# Patient Record
Sex: FEMALE | Race: BLACK OR AFRICAN AMERICAN | Employment: FULL TIME | URBAN - METROPOLITAN AREA
[De-identification: names, ages, dates, MRNs, and addresses within clinical notes are randomized per-mention and may not be internally consistent; named-entity substitution may affect disease eponyms.]

---

## 2018-07-23 ENCOUNTER — TELEPHONE (OUTPATIENT)
Dept: OBGYN CLINIC | Age: 58
End: 2018-07-23

## 2018-07-23 RX ORDER — AZITHROMYCIN 250 MG/1
250 TABLET, FILM COATED ORAL DAILY
Qty: 1 PACKET | Refills: 0 | Status: SHIPPED | OUTPATIENT
Start: 2018-07-23 | End: 2018-08-02

## 2018-07-24 ENCOUNTER — TELEPHONE (OUTPATIENT)
Dept: OBGYN CLINIC | Age: 58
End: 2018-07-24

## 2018-07-24 NOTE — TELEPHONE ENCOUNTER
Returned phone call to pharmacy and gave verbal to pharmacist to dispense amoxicillin 500 mg tablets bid for 14 days.

## 2019-01-14 ENCOUNTER — OFFICE VISIT (OUTPATIENT)
Dept: INTERNAL MEDICINE CLINIC | Age: 59
End: 2019-01-14

## 2019-01-14 VITALS
TEMPERATURE: 97.9 F | SYSTOLIC BLOOD PRESSURE: 180 MMHG | WEIGHT: 186 LBS | HEIGHT: 65 IN | DIASTOLIC BLOOD PRESSURE: 100 MMHG | BODY MASS INDEX: 30.99 KG/M2 | HEART RATE: 90 BPM | OXYGEN SATURATION: 98 %

## 2019-01-14 DIAGNOSIS — I10 ESSENTIAL HYPERTENSION: Primary | ICD-10-CM

## 2019-01-14 PROCEDURE — 99203 OFFICE O/P NEW LOW 30 MIN: CPT | Performed by: FAMILY MEDICINE

## 2019-01-14 RX ORDER — AMLODIPINE BESYLATE 5 MG/1
5 TABLET ORAL DAILY
Qty: 30 TABLET | Refills: 3 | Status: SHIPPED | OUTPATIENT
Start: 2019-01-14 | End: 2019-01-21 | Stop reason: DRUGHIGH

## 2019-01-14 RX ORDER — HYDROCHLOROTHIAZIDE 12.5 MG/1
12.5 CAPSULE, GELATIN COATED ORAL DAILY
Qty: 30 CAPSULE | Refills: 3 | Status: CANCELLED | OUTPATIENT
Start: 2019-01-14

## 2019-01-14 ASSESSMENT — PATIENT HEALTH QUESTIONNAIRE - PHQ9
SUM OF ALL RESPONSES TO PHQ9 QUESTIONS 1 & 2: 0
SUM OF ALL RESPONSES TO PHQ QUESTIONS 1-9: 0
SUM OF ALL RESPONSES TO PHQ QUESTIONS 1-9: 0
2. FEELING DOWN, DEPRESSED OR HOPELESS: 0
1. LITTLE INTEREST OR PLEASURE IN DOING THINGS: 0

## 2019-01-15 DIAGNOSIS — I10 ESSENTIAL HYPERTENSION: ICD-10-CM

## 2019-01-15 LAB
BACTERIA: NORMAL /HPF
BILIRUBIN URINE: NEGATIVE
BLOOD, URINE: ABNORMAL
CLARITY: CLEAR
COLOR: YELLOW
EPITHELIAL CELLS, UA: NORMAL /HPF
GLUCOSE URINE: NEGATIVE MG/DL
KETONES, URINE: NEGATIVE MG/DL
LEUKOCYTE ESTERASE, URINE: ABNORMAL
NITRITE, URINE: POSITIVE
PH UA: 5.5 (ref 5–9)
PROTEIN UA: NEGATIVE MG/DL
RBC UA: NORMAL /HPF (ref 0–2)
SPECIFIC GRAVITY UA: 1 (ref 1–1.03)
URINE REFLEX TO CULTURE: YES
UROBILINOGEN, URINE: 0.2 E.U./DL
WBC UA: NORMAL /HPF (ref 0–5)

## 2019-01-16 LAB
ALBUMIN SERPL-MCNC: 4.2 G/DL
ALP BLD-CCNC: 93 U/L
ALT SERPL-CCNC: NORMAL U/L
ANION GAP SERPL CALCULATED.3IONS-SCNC: NORMAL MMOL/L
AST SERPL-CCNC: NORMAL U/L
BILIRUB SERPL-MCNC: 0.4 MG/DL (ref 0.1–1.4)
BUN BLDV-MCNC: 11 MG/DL
CALCIUM SERPL-MCNC: 9.2 MG/DL
CHLORIDE BLD-SCNC: 102 MMOL/L
CHOLESTEROL, TOTAL: 213 MG/DL
CHOLESTEROL/HDL RATIO: NORMAL
CO2: NORMAL MMOL/L
CREAT SERPL-MCNC: 1.04 MG/DL
GFR CALCULATED: NORMAL
GLUCOSE BLD-MCNC: 102 MG/DL
HDLC SERPL-MCNC: 55 MG/DL (ref 35–70)
LDL CHOLESTEROL CALCULATED: 133 MG/DL (ref 0–160)
POTASSIUM SERPL-SCNC: 4.1 MMOL/L
SODIUM BLD-SCNC: 141 MMOL/L
TOTAL PROTEIN: 7.2
TRIGL SERPL-MCNC: 125 MG/DL
VLDLC SERPL CALC-MCNC: 25 MG/DL

## 2019-01-17 LAB — URINE CULTURE, ROUTINE: NORMAL

## 2019-01-18 ENCOUNTER — HOSPITAL ENCOUNTER (OUTPATIENT)
Dept: NON INVASIVE DIAGNOSTICS | Age: 59
Discharge: HOME | End: 2019-01-18

## 2019-01-18 DIAGNOSIS — I10 ESSENTIAL HYPERTENSION: ICD-10-CM

## 2019-01-18 LAB
EKG ATRIAL RATE: 73 BPM
EKG P AXIS: 53 DEGREES
EKG P-R INTERVAL: 150 MS
EKG Q-T INTERVAL: 434 MS
EKG QRS DURATION: 86 MS
EKG QTC CALCULATION (BAZETT): 478 MS
EKG R AXIS: 55 DEGREES
EKG T AXIS: 245 DEGREES
EKG VENTRICULAR RATE: 73 BPM

## 2019-01-18 PROCEDURE — 93010 ELECTROCARDIOGRAM REPORT: CPT | Performed by: INTERNAL MEDICINE

## 2019-01-18 PROCEDURE — 93005 ELECTROCARDIOGRAM TRACING: CPT

## 2019-01-21 ENCOUNTER — OFFICE VISIT (OUTPATIENT)
Dept: INTERNAL MEDICINE CLINIC | Age: 59
End: 2019-01-21

## 2019-01-21 VITALS
HEIGHT: 65 IN | OXYGEN SATURATION: 98 % | SYSTOLIC BLOOD PRESSURE: 178 MMHG | BODY MASS INDEX: 30.62 KG/M2 | TEMPERATURE: 98 F | DIASTOLIC BLOOD PRESSURE: 70 MMHG | WEIGHT: 183.8 LBS | HEART RATE: 92 BPM

## 2019-01-21 DIAGNOSIS — I51.7 LEFT VENTRICULAR HYPERTROPHY BY ELECTROCARDIOGRAM: ICD-10-CM

## 2019-01-21 DIAGNOSIS — I10 ESSENTIAL HYPERTENSION: Primary | ICD-10-CM

## 2019-01-21 PROCEDURE — 99214 OFFICE O/P EST MOD 30 MIN: CPT | Performed by: FAMILY MEDICINE

## 2019-01-21 RX ORDER — LISINOPRIL 20 MG/1
20 TABLET ORAL DAILY
Qty: 30 TABLET | Refills: 3 | Status: SHIPPED | OUTPATIENT
Start: 2019-01-21 | End: 2019-01-28 | Stop reason: DRUGHIGH

## 2019-01-21 RX ORDER — ATORVASTATIN CALCIUM 40 MG/1
40 TABLET, FILM COATED ORAL DAILY
Qty: 30 TABLET | Refills: 3 | Status: CANCELLED | OUTPATIENT
Start: 2019-01-21

## 2019-01-21 RX ORDER — HYDROCHLOROTHIAZIDE 25 MG/1
25 TABLET ORAL DAILY
Qty: 30 TABLET | Refills: 0 | Status: CANCELLED | OUTPATIENT
Start: 2019-01-21

## 2019-01-21 RX ORDER — AMLODIPINE BESYLATE 10 MG/1
10 TABLET ORAL DAILY
Qty: 30 TABLET | Refills: 3 | Status: SHIPPED | OUTPATIENT
Start: 2019-01-21 | End: 2019-05-20 | Stop reason: SDUPTHER

## 2019-01-28 ENCOUNTER — OFFICE VISIT (OUTPATIENT)
Dept: INTERNAL MEDICINE CLINIC | Age: 59
End: 2019-01-28

## 2019-01-28 VITALS
BODY MASS INDEX: 29.89 KG/M2 | HEART RATE: 73 BPM | DIASTOLIC BLOOD PRESSURE: 94 MMHG | TEMPERATURE: 98 F | HEIGHT: 66 IN | SYSTOLIC BLOOD PRESSURE: 162 MMHG | WEIGHT: 186 LBS | OXYGEN SATURATION: 98 %

## 2019-01-28 DIAGNOSIS — I10 ESSENTIAL HYPERTENSION: Primary | ICD-10-CM

## 2019-01-28 PROBLEM — I51.7 LEFT VENTRICULAR HYPERTROPHY: Status: ACTIVE | Noted: 2019-01-28

## 2019-01-28 PROCEDURE — 99214 OFFICE O/P EST MOD 30 MIN: CPT | Performed by: FAMILY MEDICINE

## 2019-01-28 RX ORDER — LISINOPRIL 40 MG/1
40 TABLET ORAL DAILY
Qty: 30 TABLET | Refills: 3 | Status: SHIPPED | OUTPATIENT
Start: 2019-01-28 | End: 2019-05-20 | Stop reason: SDUPTHER

## 2019-02-18 ENCOUNTER — OFFICE VISIT (OUTPATIENT)
Dept: INTERNAL MEDICINE CLINIC | Age: 59
End: 2019-02-18

## 2019-02-18 VITALS
WEIGHT: 185 LBS | OXYGEN SATURATION: 98 % | DIASTOLIC BLOOD PRESSURE: 82 MMHG | SYSTOLIC BLOOD PRESSURE: 140 MMHG | HEART RATE: 81 BPM | RESPIRATION RATE: 16 BRPM | BODY MASS INDEX: 29.73 KG/M2 | HEIGHT: 66 IN | TEMPERATURE: 98.2 F

## 2019-02-18 DIAGNOSIS — I10 ESSENTIAL HYPERTENSION: Primary | ICD-10-CM

## 2019-02-18 PROCEDURE — 99213 OFFICE O/P EST LOW 20 MIN: CPT | Performed by: FAMILY MEDICINE

## 2019-05-03 DIAGNOSIS — Z12.31 SCREENING MAMMOGRAM, ENCOUNTER FOR: Primary | ICD-10-CM

## 2019-05-04 ENCOUNTER — HOSPITAL ENCOUNTER (OUTPATIENT)
Dept: WOMENS IMAGING | Age: 59
Discharge: HOME | End: 2019-05-06
Payer: COMMERCIAL

## 2019-05-04 DIAGNOSIS — Z12.31 SCREENING MAMMOGRAM, ENCOUNTER FOR: ICD-10-CM

## 2019-05-04 PROCEDURE — 77063 BREAST TOMOSYNTHESIS BI: CPT

## 2019-05-20 ENCOUNTER — OFFICE VISIT (OUTPATIENT)
Dept: FAMILY MEDICINE CLINIC | Age: 59
End: 2019-05-20

## 2019-05-20 VITALS
DIASTOLIC BLOOD PRESSURE: 78 MMHG | OXYGEN SATURATION: 97 % | HEART RATE: 78 BPM | SYSTOLIC BLOOD PRESSURE: 140 MMHG | WEIGHT: 185 LBS | TEMPERATURE: 49.6 F | HEIGHT: 63 IN | BODY MASS INDEX: 32.78 KG/M2

## 2019-05-20 DIAGNOSIS — I10 ESSENTIAL HYPERTENSION: ICD-10-CM

## 2019-05-20 DIAGNOSIS — Z12.11 SCREEN FOR COLON CANCER: Primary | ICD-10-CM

## 2019-05-20 DIAGNOSIS — I51.7 LEFT VENTRICULAR HYPERTROPHY BY ELECTROCARDIOGRAM: ICD-10-CM

## 2019-05-20 PROCEDURE — 99213 OFFICE O/P EST LOW 20 MIN: CPT | Performed by: FAMILY MEDICINE

## 2019-05-20 RX ORDER — AMLODIPINE BESYLATE 10 MG/1
10 TABLET ORAL DAILY
Qty: 30 TABLET | Refills: 3 | Status: SHIPPED | OUTPATIENT
Start: 2019-05-20 | End: 2019-08-02 | Stop reason: SDUPTHER

## 2019-05-20 RX ORDER — HYDROCHLOROTHIAZIDE 12.5 MG/1
12.5 CAPSULE, GELATIN COATED ORAL EVERY MORNING
Qty: 14 CAPSULE | Refills: 0 | Status: SHIPPED | OUTPATIENT
Start: 2019-05-20 | End: 2019-06-03

## 2019-05-20 RX ORDER — LISINOPRIL 40 MG/1
40 TABLET ORAL DAILY
Qty: 30 TABLET | Refills: 3 | Status: SHIPPED | OUTPATIENT
Start: 2019-05-20 | End: 2019-08-02 | Stop reason: SDUPTHER

## 2019-05-20 NOTE — PROGRESS NOTES
Chief Complaint   Patient presents with    Hypertension     pt f/u htn        HPI: Christian Overton 61 y.o. female presenting for     Patient is here for follow up. As you may recall patient was seen for uncontrolled hypertension. Below is the last encounter. HTN  Was at an employee clinic. At the clinic, Blood pressure was 220/110. They decided to take it manually and it was 180/100. Admits in the past, her blood pressure was borderline high (managed in the past by diet). Was on hctz in the past but medication was discontinued 2/2 to hypotension. Denies any changes in vision, headaches, chest pain, shortness of breath, abdominal pain, changes in urination, changes in stools. Denies any high sodium intake. Admits to exercising (goes to [employer], treadmill, elliptical 3 times a week at least 1.5-2 hours). Admits to a lot of stress (dealing with caring for her parents before they passed, and daughters pregnancies and supporting her children, and financial issues). Patient does have a family history of HTN. f/u  Patient reports that she has increased the Norvasc to 10 mg daily. Has been recording her blood pressures. They have ranged in the 965W-015X systolic/ 98F-86E diastolic. Denies any chest pain, shortness of breath, changes in vision, headaches, abdominal pain, changes in urination, changes in stools. Patient has been trying to reduce her stress in her life. Patient has been taking a baby aspirin daily. F/u 1/28/19   Blood pressure have improved. Home blood pressures have been in the 150s-160s/70s-80s. Denies any symptoms or complaints at this time. Patient is currently on Lisinopril 20 mg and Norvasc 10 mg. Reports compliance with the medication. Patient still admits to stress in her life. Denies any fever, chills, nausea, vomiting chest pain, shorn henna of breath, abdominal pain, changes in urination, or changes in stools.       F/u 2/18/19  F/u patient reports that she hsa been doing well blood pressures at home were 130-138/78-86. Has been complaint with medication. Heart rates has been in the 70s- 90s. Denies any current chest pain, shortness of breath, fever, chills, nausea, vomiting, abdominal pain, changes in urination, or changes in stools. Patient reports that she ran to her appointment today. Patient is currently through orientation for her new job. Recently went to the health fair and blood pressure was found to be below 140s/90s. BP Readings from Last 20 Encounters:   05/20/19 (!) 140/78   02/18/19 (!) 140/82   01/28/19 (!) 162/94   01/21/19 (!) 178/70   01/14/19 (!) 180/100   ]    The 10-year ASCVD risk score (Lola Donaldson, et al., 2013) is: 8.8%    Values used to calculate the score:      Age: 61 years      Sex: Female      Is Non- : Yes      Diabetic: No      Tobacco smoker: No      Systolic Blood Pressure: 309 mmHg      Is BP treated: Yes      HDL Cholesterol: 55 mg/dL      Total Cholesterol: 213 mg/dL         Current Outpatient Medications   Medication Sig Dispense Refill    lisinopril (PRINIVIL;ZESTRIL) 40 MG tablet Take 1 tablet by mouth daily 30 tablet 3    amLODIPine (NORVASC) 10 MG tablet Take 1 tablet by mouth daily 30 tablet 3    hydrochlorothiazide (MICROZIDE) 12.5 MG capsule Take 1 capsule by mouth every morning 14 capsule 0    aspirin 81 MG tablet Take 81 mg by mouth daily      Multiple Vitamin (MULTI-VITAMINS PO) Take by mouth       No current facility-administered medications for this visit. ROS  CONSTITUTIONAL: The patient denies fevers, chills, sweats and body ache. HEENT: Denies headache, blurry vision, eye pain, tinnitus, vertigo,  sore throat, neck or thyroid masses. RESPIRATORY: Denies cough, sputum, hemoptysis. CARDIAC: Denies chest pain, pressure, palpitations, Denies lower extremity edema.   GASTROINTESTINAL: Denies abdominal pain, constipation, diarrhea, bleeding in the stools,   GENITOURINARY: Denies dysuria, hematuria, nocturia or frequency, urinary incontinence. NEUROLOGIC: Denies headaches, dizziness, syncope, weakness  MUSCULOSKELETAL: denies changes in range of motion, joint pain, stiffness. ENDOCRINOLOGY: Denies heat or cold intolerance. HEMATOLOGY: Denies easy bleeding or blood transfusion,anemia  DERMATOLOGY: Denies changes in moles or pigmentation changes. PSYCHIATRY: Denies depression, agitation or anxiety.     Past Medical History:   Diagnosis Date    Hypertension         Past Surgical History:   Procedure Laterality Date    APPENDECTOMY      26     SECTION      1992    CYST REMOVAL Right 1983    ovarian cyst removal     TONSILLECTOMY  1967        Family History   Problem Relation Age of Onset    Diabetes Mother     High Blood Pressure Mother     Stroke Mother     Other Mother         cornary bi pass    Diabetes Father [de-identified]    Dementia Father     Pacemaker Father         Social History     Socioeconomic History    Marital status: Single     Spouse name: Not on file    Number of children: Not on file    Years of education: Not on file    Highest education level: Not on file   Occupational History    Not on file   Social Needs    Financial resource strain: Not on file    Food insecurity:     Worry: Not on file     Inability: Not on file    Transportation needs:     Medical: Not on file     Non-medical: Not on file   Tobacco Use    Smoking status: Never Smoker    Smokeless tobacco: Never Used   Substance and Sexual Activity    Alcohol use: No    Drug use: No    Sexual activity: Never   Lifestyle    Physical activity:     Days per week: Not on file     Minutes per session: Not on file    Stress: Not on file   Relationships    Social connections:     Talks on phone: Not on file     Gets together: Not on file     Attends [employer] service: Not on file     Active member of club or organization: Not on file     Attends meetings of clubs or organizations: Not on BUN 11 01/16/2019    CREATININE 1.04 01/16/2019    GLUCOSE 102 01/16/2019    CALCIUM 9.2 01/16/2019    LABALBU 4.2 01/16/2019    BILITOT 0.4 01/16/2019    ALKPHOS 93 01/16/2019     Lab Results   Component Value Date    CHOL 213 01/16/2019    CHOL 208 08/29/2015     Lab Results   Component Value Date    TRIG 125 01/16/2019    TRIG 142 08/29/2015     Lab Results   Component Value Date    HDL 55 01/16/2019    HDL 57 08/29/2015     Lab Results   Component Value Date    LDLCALC 133 01/16/2019    LDLCALC 123 08/29/2015     Lab Results   Component Value Date    VLDL 25 01/16/2019     No results found for: CHOLHDLRATIO    1/18/2019  6:12 PM - Jake, Chpo Incoming Scans From \[facility]\""     Component Results     Component Value Ref Range & Units Status Collected Lab   Ventricular Rate 73  BPM Final 01/18/2019  7:55    Atrial Rate 73  BPM Final 01/18/2019  7:55    P-R Interval 150  ms Final 01/18/2019  7:55    QRS Duration 86  ms Final 01/18/2019  7:55    Q-T Interval 434  ms Final 01/18/2019  7:55    QTc Calculation (Bazett) 478  ms Final 01/18/2019  7:55    P Axis 53  degrees Final 01/18/2019  7:55    R Axis 55  degrees Final 01/18/2019  7:55    T Axis 245  degrees Final 01/18/2019  7:55    Testing Performed By     Lab - Abbreviation Name Director Address Valid Date Range   349-Unknown MLOZ MUSE Unknown Unknown 04/08/16 2059-Present   Narrative     Normal sinus rhythm with sinus arrhythmia  Left ventricular hypertrophy with repolarization abnormality  Abnormal ECG  When compared with ECG of 18-JAN-2019 07:55,  No significant change was found  Confirmed by Rosey Pizano ([id number]) on 1/18/2019 6:11:24 PM       A/P: Mariam Sanchez 61 y.o. female presenting for     1. Essential hypertension  Still uncontrolled. Will had Microzide to see if this will help with her blood pressure.  Patient given more information on the medication.   - lisinopril (PRINIVIL;ZESTRIL) 40 MG tablet; Take 1 tablet by mouth daily  Dispense: 30 tablet; Refill: 3  - amLODIPine (NORVASC) 10 MG tablet; Take 1 tablet by mouth daily  Dispense: 30 tablet; Refill: 3  - hydrochlorothiazide (MICROZIDE) 12.5 MG capsule; Take 1 capsule by mouth every morning  Dispense: 14 capsule; Refill: 0    2. Screen for colon cancer    - COLOGUARD;  Future

## 2019-06-03 ENCOUNTER — OFFICE VISIT (OUTPATIENT)
Dept: FAMILY MEDICINE CLINIC | Age: 59
End: 2019-06-03

## 2019-06-03 ENCOUNTER — TELEPHONE (OUTPATIENT)
Dept: FAMILY MEDICINE CLINIC | Age: 59
End: 2019-06-03

## 2019-06-03 VITALS
BODY MASS INDEX: 30.52 KG/M2 | HEIGHT: 65 IN | DIASTOLIC BLOOD PRESSURE: 70 MMHG | RESPIRATION RATE: 12 BRPM | HEART RATE: 78 BPM | TEMPERATURE: 98.5 F | SYSTOLIC BLOOD PRESSURE: 120 MMHG | WEIGHT: 183.2 LBS | OXYGEN SATURATION: 99 %

## 2019-06-03 DIAGNOSIS — M54.5 CHRONIC LOW BACK PAIN, UNSPECIFIED BACK PAIN LATERALITY, WITH SCIATICA PRESENCE UNSPECIFIED: ICD-10-CM

## 2019-06-03 DIAGNOSIS — I10 ESSENTIAL HYPERTENSION: Primary | ICD-10-CM

## 2019-06-03 DIAGNOSIS — G89.29 CHRONIC LOW BACK PAIN, UNSPECIFIED BACK PAIN LATERALITY, WITH SCIATICA PRESENCE UNSPECIFIED: ICD-10-CM

## 2019-06-03 PROCEDURE — 99214 OFFICE O/P EST MOD 30 MIN: CPT | Performed by: FAMILY MEDICINE

## 2019-06-03 RX ORDER — KETOROLAC TROMETHAMINE 30 MG/ML
60 INJECTION, SOLUTION INTRAMUSCULAR; INTRAVENOUS ONCE
Qty: 2 ML | Refills: 0 | Status: CANCELLED
Start: 2019-06-03 | End: 2019-06-03

## 2019-06-03 RX ORDER — KETOROLAC TROMETHAMINE 30 MG/ML
30 INJECTION, SOLUTION INTRAMUSCULAR; INTRAVENOUS ONCE
Qty: 1 ML | Refills: 0
Start: 2019-06-03 | End: 2020-01-25

## 2019-06-03 NOTE — PATIENT INSTRUCTIONS
Patient Education        metoprolol  Pronunciation:  me TOE pro lol  Brand:  Kapspargo Sprinkle, Lopressor, Metoprolol Succinate ER, Metoprolol Tartrate, Toprol-XL  What is the most important information I should know about metoprolol? You should not use this medicine if you have a serious heart problem (heart block, sick sinus syndrome, slow heart rate), severe circulation problems, severe heart failure, or a history of slow heart beats that caused fainting. What is metoprolol? Metoprolol is a beta-blocker that affects the heart and circulation (blood flow through arteries and veins). Metoprolol is used to treat angina (chest pain) and hypertension (high blood pressure). It is also used to lower your risk of death or needing to be hospitalized for heart failure. Metoprolol may also be used for other purposes not listed in this medication guide. What should I discuss with my healthcare provider before taking metoprolol? You should not use this medicine if you are allergic to metoprolol, or other beta-blockers (atenolol, carvedilol, labetalol, nadolol, nebivolol, propranolol, sotalol, and others), or if you have:  · a serious heart problem such as heart block, sick sinus syndrome, or slow heart rate;  · severe circulation problems;  · severe heart failure (that required you to be in the hospital); or  · a history of slow heart beats that have caused you to faint. Tell your doctor if you have ever had:  · asthma, chronic obstructive pulmonary disease (COPD), sleep apnea, or other breathing disorder;  · diabetes (taking metoprolol may make it harder for you to tell when you have low blood sugar);  · liver disease;  · congestive heart failure;  · problems with circulation (such as Raynaud's syndrome);  · a thyroid disorder; or  · pheochromocytoma (tumor of the adrenal gland). Do not give this medicine to a child without medical advice. Tell your doctor if you are pregnant or plan to become pregnant.  It is not known whether metoprolol will harm an unborn baby. However, having high blood pressure during pregnancy may cause complications such as diabetes or eclampsia (dangerously high blood pressure that can lead to medical problems in both mother and baby). The benefit of treating hypertension may outweigh any risks to the baby. Ask a doctor before using this medicine if you are breast-feeding. Metoprolol can pass into breast milk and may cause dry skin, dry mouth, diarrhea, constipation, or slow heartbeats in your baby. How should I take metoprolol? Follow all directions on your prescription label and read all medication guides or instruction sheets. Your doctor may occasionally change your dose. Use the medicine exactly as directed. Metoprolol should be taken with a meal or just after a meal.  Take the medicine at the same time each day. Swallow the capsule  whole and do not crush, chew, break, or open it. A Toprol XL  tablet can be divided in half if your doctor has told you to do so. Swallow the half-tablet whole, without chewing or crushing. You will need frequent medical tests, and your blood pressure will need to be checked often. If you need surgery, tell the surgeon ahead of time that you are using metoprolol. You should not stop using metoprolol suddenly. Stopping suddenly may make your condition worse. If you have high blood pressure, keep using this medicine even if you feel well. High blood pressure often has no symptoms. You may need to use metoprolol for the rest of your life. Store at room temperature away from moisture and heat. What happens if I miss a dose? Skip the missed dose and use your next dose at the regular time. Do not use two doses at one time. What happens if I overdose? Seek emergency medical attention or call the Poison Help line at 1-796.207.8873. What should I avoid while taking metoprolol?   Avoid driving or hazardous activity until you know how this medicine will affect you. Your reactions could be impaired. Drinking alcohol can increase certain side effects of metoprolol. What are the possible side effects of metoprolol? Get emergency medical help if you have signs of an allergic reaction: hives; difficulty breathing; swelling of your face, lips, tongue, or throat. Call your doctor at once if you have:  · very slow heartbeats;  · a light-headed feeling, like you might pass out;  · shortness of breath (even with mild exertion), swelling, rapid weight gain; or  · cold feeling in your hands and feet. Common side effects may include:  · dizziness, tired feeling;  · depression, confusion, memory problems;  · nightmares, trouble sleeping;  · diarrhea; or  · mild itching or rash. This is not a complete list of side effects and others may occur. Call your doctor for medical advice about side effects. You may report side effects to FDA at 8-549-FDA-2473. What other drugs will affect metoprolol? Tell your doctor about all your current medicines. Many drugs can affect metoprolol, especially:  · any other heart or blood pressure medications;  · epinephrine (Epi-Pen);  · an antidepressant;  · an ergot medicine --dihydroergotamine, ergonovine, ergotamine, methylergonovine; or  · an MAO inhibitor --isocarboxazid, linezolid, phenelzine, rasagiline, selegiline, tranylcypromine. This list is not complete and many other drugs may affect metoprolol. This includes prescription and over-the-counter medicines, vitamins, and herbal products. Not all possible drug interactions are listed here. Where can I get more information? Your pharmacist can provide more information about metoprolol. Remember, keep this and all other medicines out of the reach of children, never share your medicines with others, and use this medication only for the indication prescribed.   Every effort has been made to ensure that the information provided by [address] is accurate, up-to-date, and complete, but no guarantee is made to that effect. Drug information contained herein may be time sensitive. [employer] information has been compiled for use by healthcare practitioners and consumers in the [place] and therefore [employer] does not warrant that uses outside of the [place] are appropriate, unless specifically indicated otherwise. [facility]'s drug information does not endorse drugs, diagnose patients or recommend therapy. [facility] drug information is an informational resource designed to assist licensed healthcare practitioners in caring for their patients and/or to serve consumers viewing this service as a supplement to, and not a substitute for, the expertise, skill, knowledge and judgment of healthcare practitioners. The absence of a warning for a given drug or drug combination in no way should be construed to indicate that the drug or drug combination is safe, effective or appropriate for any given patient. [facility] does not assume any responsibility for any aspect of healthcare administered with the aid of information [facility] provides. The information contained herein is not intended to cover all possible uses, directions, precautions, warnings, drug interactions, allergic reactions, or adverse effects. If you have questions about the drugs you are taking, check with your doctor, nurse or pharmacist.  Copyright 7036-1818 [address]: 17.01. Revision date: 8/10/2018. Care instructions adapted under license by [facility] ([facility]). If you have questions about a medical condition or this instruction, always ask your healthcare professional. [place] [address] any warranty or liability for your use of this information.

## 2019-06-03 NOTE — PROGRESS NOTES
No chief complaint on file. HPI: Krystyna Shine 61 y.o. female presenting for     Patient is here for follow up. As you may recall patient was seen for uncontrolled hypertension. Below is the last encounter. HTN  Was at an employee clinic. At the clinic, Blood pressure was 220/110. They decided to take it manually and it was 180/100. Admits in the past, her blood pressure was borderline high (managed in the past by diet). Was on hctz in the past but medication was discontinued 2/2 to hypotension. Denies any changes in vision, headaches, chest pain, shortness of breath, abdominal pain, changes in urination, changes in stools. Denies any high sodium intake. Admits to exercising (goes to [employer], treadmill, elliptical 3 times a week at least 1.5-2 hours). Admits to a lot of stress (dealing with caring for her parents before they passed, and daughters pregnancies and supporting her children, and financial issues). Patient does have a family history of HTN. f/u  Patient reports that she has increased the Norvasc to 10 mg daily. Has been recording her blood pressures. They have ranged in the 464S-060C systolic/ 06Y-79H diastolic. Denies any chest pain, shortness of breath, changes in vision, headaches, abdominal pain, changes in urination, changes in stools. Patient has been trying to reduce her stress in her life. Patient has been taking a baby aspirin daily. F/u 1/28/19   Blood pressure have improved. Home blood pressures have been in the 150s-160s/70s-80s. Denies any symptoms or complaints at this time. Patient is currently on Lisinopril 20 mg and Norvasc 10 mg. Reports compliance with the medication. Patient still admits to stress in her life. Denies any fever, chills, nausea, vomiting chest pain, shorn henna of breath, abdominal pain, changes in urination, or changes in stools.       F/u 2/18/19  F/u patient reports that she hsa been doing well blood pressures at home were 130-138/78-86. Has been complaint with medication. Heart rates has been in the 70s- 90s. Denies any current chest pain, shortness of breath, fever, chills, nausea, vomiting, abdominal pain, changes in urination, or changes in stools. Patient reports that she ran to her appointment today. Patient is currently through orientation for her new job. Recently went to the health fair and blood pressure was found to be below 140s/90s. F/u   Stopped taking the medication on Saturday (6/1/) due to having diarrhea on the medication. Patient was unsure if she was coming down with something while being on the medication. BP Readings from Last 20 Encounters:   06/03/19 (!) 150/72   05/20/19 (!) 140/78   02/18/19 (!) 140/82   01/28/19 (!) 162/94   01/21/19 (!) 178/70   01/14/19 (!) 180/100   ]    The 10-year ASCVD risk score (Chelsea Boston et al., 2013) is: 10.8%    Values used to calculate the score:      Age: 61 years      Sex: Female      Is Non- : Yes      Diabetic: No      Tobacco smoker: No      Systolic Blood Pressure: 639 mmHg      Is BP treated: Yes      HDL Cholesterol: 55 mg/dL      Total Cholesterol: 213 mg/dL       Back pain   Reports that she was moving a table with her daughter. Feels like she strained a muscle. Tried tylenol 1 extra-strength tylenol no too long a go. Denies any urinary or stool intonencce. Feels like a muslce strain. Current Outpatient Medications   Medication Sig Dispense Refill    lisinopril (PRINIVIL;ZESTRIL) 40 MG tablet Take 1 tablet by mouth daily 30 tablet 3    amLODIPine (NORVASC) 10 MG tablet Take 1 tablet by mouth daily 30 tablet 3    aspirin 81 MG tablet Take 81 mg by mouth daily      Multiple Vitamin (MULTI-VITAMINS PO) Take by mouth      hydrochlorothiazide (MICROZIDE) 12.5 MG capsule Take 1 capsule by mouth every morning 14 capsule 0     No current facility-administered medications for this visit.          ROS  CONSTITUTIONAL: The patient denies fevers, chills, sweats and body ache. HEENT: Denies headache, blurry vision, eye pain, tinnitus, vertigo,  sore throat, neck or thyroid masses. RESPIRATORY: Denies cough, sputum, hemoptysis. CARDIAC: Denies chest pain, pressure, palpitations, Denies lower extremity edema. GASTROINTESTINAL: Denies abdominal pain, constipation, diarrhea, bleeding in the stools,   GENITOURINARY: Denies dysuria, hematuria, nocturia or frequency, urinary incontinence. NEUROLOGIC: Denies headaches, dizziness, syncope, weakness  MUSCULOSKELETAL: denies changes in range of motion, joint pain, stiffness. Admits to lower back pain. No radiation of the pain. ENDOCRINOLOGY: Denies heat or cold intolerance. HEMATOLOGY: Denies easy bleeding or blood transfusion,anemia  DERMATOLOGY: Denies changes in moles or pigmentation changes. PSYCHIATRY: Denies depression, agitation or anxiety.     Past Medical History:   Diagnosis Date    Hypertension         Past Surgical History:   Procedure Laterality Date    APPENDECTOMY      26     SECTION      1992    CYST REMOVAL Right 1983    ovarian cyst removal     TONSILLECTOMY  1967        Family History   Problem Relation Age of Onset    Diabetes Mother     High Blood Pressure Mother     Stroke Mother     Other Mother         cornary bi pass    Diabetes Father [de-identified]    Dementia Father     Pacemaker Father         Social History     Socioeconomic History    Marital status: Single     Spouse name: Not on file    Number of children: Not on file    Years of education: Not on file    Highest education level: Not on file   Occupational History    Not on file   Social Needs    Financial resource strain: Not on file    Food insecurity:     Worry: Not on file     Inability: Not on file    Transportation needs:     Medical: Not on file     Non-medical: Not on file   Tobacco Use    Smoking status: Never Smoker    Smokeless tobacco: Never Used   Substance and Sexual Extremities - peripheral pulses normal, no pedal edema, no clubbing or cyanosis   Skin - normal coloration and turgor, no rashes, no suspicious skin lesions noted      Labs   No results found for: TSHREFLEX  No results found for: TSH    Reviewed. Outside labs. CBC still pending. CMP showed slightly elevated creatinine level at 1.04 (cut off was 1.00).    Lab Results   Component Value Date     01/16/2019    K 4.1 01/16/2019     01/16/2019    BUN 11 01/16/2019    CREATININE 1.04 01/16/2019    GLUCOSE 102 01/16/2019    CALCIUM 9.2 01/16/2019    LABALBU 4.2 01/16/2019    BILITOT 0.4 01/16/2019    ALKPHOS 93 01/16/2019     Lab Results   Component Value Date    CHOL 213 01/16/2019    CHOL 208 08/29/2015     Lab Results   Component Value Date    TRIG 125 01/16/2019    TRIG 142 08/29/2015     Lab Results   Component Value Date    HDL 55 01/16/2019    HDL 57 08/29/2015     Lab Results   Component Value Date    LDLCALC 133 01/16/2019    LDLCALC 123 08/29/2015     Lab Results   Component Value Date    VLDL 25 01/16/2019     No results found for: CHOLHDLRATIO    1/18/2019  6:12 PM - Jake, Chpo Incoming Scans From [facility]     Component Results     Component Value Ref Range & Units Status Collected Lab   Ventricular Rate 73  BPM Final 01/18/2019  7:55    Atrial Rate 73  BPM Final 01/18/2019  7:55    P-R Interval 150  ms Final 01/18/2019  7:55    QRS Duration 86  ms Final 01/18/2019  7:55    Q-T Interval 434  ms Final 01/18/2019  7:55    QTc Calculation (Bazett) 478  ms Final 01/18/2019  7:55    P Axis 53  degrees Final 01/18/2019  7:55    R Axis 55  degrees Final 01/18/2019  7:55    T Axis 245  degrees Final 01/18/2019  7:55    Testing Performed By     Lab - Abbreviation Name Director Address Valid Date Range   349-Unknown MLOZ MUSE Unknown Unknown 04/08/16 2059-Present   Narrative     Normal sinus rhythm with sinus arrhythmia  Left ventricular hypertrophy with repolarization abnormality  Abnormal ECG  When compared with ECG of 18-JAN-2019 07:55,  No significant change was found  Confirmed by Yosvany Singh ([id number]) on 1/18/2019 6:11:24 PM       A/P: Fortunato Ozuna 61 y.o. female presenting for     1. Essential hypertension  Still uncontrolled. Will had Microzide to see if this will help with her blood pressure. Patient given more information on the medication.   - lisinopril (PRINIVIL;ZESTRIL) 40 MG tablet; Take 1 tablet by mouth daily  Dispense: 30 tablet; Refill: 3  - amLODIPine (NORVASC) 10 MG tablet; Take 1 tablet by mouth daily  Dispense: 30 tablet; Refill: 3  - hydrochlorothiazide (MICROZIDE) 12.5 MG capsule; Take 1 capsule by mouth every morning  Dispense: 14 capsule; Refill: 0    2. Screen for colon cancer    - COLOGUARD;  Future

## 2019-08-02 ENCOUNTER — OFFICE VISIT (OUTPATIENT)
Dept: FAMILY MEDICINE CLINIC | Age: 59
End: 2019-08-02

## 2019-08-02 VITALS
OXYGEN SATURATION: 97 % | DIASTOLIC BLOOD PRESSURE: 84 MMHG | WEIGHT: 182 LBS | TEMPERATURE: 97 F | RESPIRATION RATE: 14 BRPM | SYSTOLIC BLOOD PRESSURE: 130 MMHG | HEIGHT: 65 IN | HEART RATE: 56 BPM | BODY MASS INDEX: 30.32 KG/M2

## 2019-08-02 DIAGNOSIS — Z23 NEED FOR SHINGLES VACCINE: ICD-10-CM

## 2019-08-02 DIAGNOSIS — I51.7 LEFT VENTRICULAR HYPERTROPHY BY ELECTROCARDIOGRAM: ICD-10-CM

## 2019-08-02 DIAGNOSIS — I10 ESSENTIAL HYPERTENSION: Primary | ICD-10-CM

## 2019-08-02 PROCEDURE — 99213 OFFICE O/P EST LOW 20 MIN: CPT | Performed by: FAMILY MEDICINE

## 2019-08-02 RX ORDER — AMLODIPINE BESYLATE 10 MG/1
10 TABLET ORAL DAILY
Qty: 30 TABLET | Refills: 2 | Status: SHIPPED | OUTPATIENT
Start: 2019-08-02 | End: 2019-11-01 | Stop reason: SDUPTHER

## 2019-08-02 RX ORDER — LISINOPRIL 40 MG/1
40 TABLET ORAL DAILY
Qty: 30 TABLET | Refills: 2 | Status: SHIPPED | OUTPATIENT
Start: 2019-08-02 | End: 2019-11-01 | Stop reason: SDUPTHER

## 2019-08-02 RX ORDER — HYDROCHLOROTHIAZIDE 12.5 MG/1
12.5 CAPSULE, GELATIN COATED ORAL EVERY MORNING
Qty: 30 CAPSULE | Refills: 0 | Status: SHIPPED | OUTPATIENT
Start: 2019-08-02 | End: 2019-08-29

## 2019-08-02 NOTE — PROGRESS NOTES
Take 81 mg by mouth daily      Multiple Vitamin (MULTI-VITAMINS PO) Take by mouth       No current facility-administered medications for this visit. ROS  CONSTITUTIONAL: The patient denies fevers, chills, sweats and body ache. HEENT: Denies headache, blurry vision, eye pain, tinnitus, vertigo,  sore throat, neck or thyroid masses. RESPIRATORY: Denies cough, sputum, hemoptysis. CARDIAC: Denies chest pain, pressure, palpitations, Denies lower extremity edema. GASTROINTESTINAL: Denies abdominal pain, constipation, diarrhea, bleeding in the stools,   GENITOURINARY: Denies dysuria, hematuria, nocturia or frequency, urinary incontinence. NEUROLOGIC: Denies headaches, dizziness, syncope, weakness  MUSCULOSKELETAL: denies changes in range of motion, joint pain, stiffness. Admits to lower back pain. No radiation of the pain. ENDOCRINOLOGY: Denies heat or cold intolerance. HEMATOLOGY: Denies easy bleeding or blood transfusion,anemia  DERMATOLOGY: Denies changes in moles or pigmentation changes. PSYCHIATRY: Denies depression, agitation or anxiety.     Past Medical History:   Diagnosis Date    Hypertension         Past Surgical History:   Procedure Laterality Date    APPENDECTOMY      26     SECTION      1992    CYST REMOVAL Right 1983    ovarian cyst removal     TONSILLECTOMY  1967        Family History   Problem Relation Age of Onset    Diabetes Mother     High Blood Pressure Mother     Stroke Mother     Other Mother         cornary bi pass    Diabetes Father [de-identified]    Dementia Father     Pacemaker Father         Social History     Socioeconomic History    Marital status: Single     Spouse name: Not on file    Number of children: Not on file    Years of education: Not on file    Highest education level: Not on file   Occupational History    Not on file   Social Needs    Financial resource strain: Not on file    Food insecurity:     Worry: Not on file     Inability: Not on file   Barnard masses or organomegaly   Neurological - alert, oriented, normal speech, no focal findings or movement disorder noted   Musculoskeletal - in the lower back there is tenderness to palpation in the lumbar paraspinal muscles. Extremities - peripheral pulses normal, no pedal edema, no clubbing or cyanosis   Skin - normal coloration and turgor, no rashes, no suspicious skin lesions noted      Labs   No results found for: TSHREFLEX  No results found for: TSH    Reviewed. Outside labs. CBC still pending. CMP showed slightly elevated creatinine level at 1.04 (cut off was 1.00).    Lab Results   Component Value Date     01/16/2019    K 4.1 01/16/2019     01/16/2019    BUN 11 01/16/2019    CREATININE 1.04 01/16/2019    GLUCOSE 102 01/16/2019    CALCIUM 9.2 01/16/2019    LABALBU 4.2 01/16/2019    BILITOT 0.4 01/16/2019    ALKPHOS 93 01/16/2019     Lab Results   Component Value Date    CHOL 213 01/16/2019    CHOL 208 08/29/2015     Lab Results   Component Value Date    TRIG 125 01/16/2019    TRIG 142 08/29/2015     Lab Results   Component Value Date    HDL 55 01/16/2019    HDL 57 08/29/2015     Lab Results   Component Value Date    LDLCALC 133 01/16/2019    LDLCALC 123 08/29/2015     Lab Results   Component Value Date    VLDL 25 01/16/2019     No results found for: CHOLHDLRATIO    1/18/2019  6:12 PM - Jake, Chpo Incoming Scans From \[facility]\""     Component Results     Component Value Ref Range & Units Status Collected Lab   Ventricular Rate 73  BPM Final 01/18/2019  7:55    Atrial Rate 73  BPM Final 01/18/2019  7:55    P-R Interval 150  ms Final 01/18/2019  7:55    QRS Duration 86  ms Final 01/18/2019  7:55    Q-T Interval 434  ms Final 01/18/2019  7:55    QTc Calculation (Bazett) 478  ms Final 01/18/2019  7:55    P Axis 53  degrees Final 01/18/2019  7:55    R Axis 55  degrees Final 01/18/2019  7:55    T Axis 245  degrees Final 01/18/2019  7:55    Testing Performed By

## 2019-08-29 ENCOUNTER — OFFICE VISIT (OUTPATIENT)
Dept: FAMILY MEDICINE CLINIC | Age: 59
End: 2019-08-29

## 2019-08-29 VITALS
DIASTOLIC BLOOD PRESSURE: 84 MMHG | TEMPERATURE: 97.8 F | HEART RATE: 56 BPM | BODY MASS INDEX: 30.32 KG/M2 | HEIGHT: 65 IN | OXYGEN SATURATION: 97 % | SYSTOLIC BLOOD PRESSURE: 139 MMHG | WEIGHT: 182 LBS

## 2019-08-29 DIAGNOSIS — I10 ESSENTIAL HYPERTENSION: Primary | ICD-10-CM

## 2019-08-29 PROCEDURE — 99213 OFFICE O/P EST LOW 20 MIN: CPT | Performed by: FAMILY MEDICINE

## 2019-08-29 RX ORDER — HYDROCHLOROTHIAZIDE 25 MG/1
25 TABLET ORAL EVERY MORNING
Qty: 30 TABLET | Refills: 2 | Status: SHIPPED | OUTPATIENT
Start: 2019-08-29 | End: 2019-11-01 | Stop reason: SDUPTHER

## 2019-11-01 ENCOUNTER — OFFICE VISIT (OUTPATIENT)
Dept: FAMILY MEDICINE CLINIC | Age: 59
End: 2019-11-01

## 2019-11-01 VITALS
WEIGHT: 186 LBS | BODY MASS INDEX: 30.99 KG/M2 | OXYGEN SATURATION: 99 % | DIASTOLIC BLOOD PRESSURE: 70 MMHG | HEIGHT: 65 IN | SYSTOLIC BLOOD PRESSURE: 110 MMHG | HEART RATE: 72 BPM

## 2019-11-01 DIAGNOSIS — Z23 NEED FOR SHINGLES VACCINE: ICD-10-CM

## 2019-11-01 DIAGNOSIS — Z12.11 ENCOUNTER FOR SCREENING COLONOSCOPY: ICD-10-CM

## 2019-11-01 DIAGNOSIS — I51.7 LEFT VENTRICULAR HYPERTROPHY BY ELECTROCARDIOGRAM: ICD-10-CM

## 2019-11-01 DIAGNOSIS — I10 ESSENTIAL HYPERTENSION: Primary | ICD-10-CM

## 2019-11-01 PROCEDURE — 99213 OFFICE O/P EST LOW 20 MIN: CPT | Performed by: FAMILY MEDICINE

## 2019-11-01 RX ORDER — HYDROCHLOROTHIAZIDE 25 MG/1
25 TABLET ORAL EVERY MORNING
Qty: 30 TABLET | Refills: 5 | Status: SHIPPED | OUTPATIENT
Start: 2019-11-01 | End: 2020-05-01 | Stop reason: SDUPTHER

## 2019-11-01 RX ORDER — AMLODIPINE BESYLATE 10 MG/1
10 TABLET ORAL DAILY
Qty: 30 TABLET | Refills: 5 | Status: SHIPPED | OUTPATIENT
Start: 2019-11-01 | End: 2020-05-01 | Stop reason: SDUPTHER

## 2019-11-01 RX ORDER — LISINOPRIL 40 MG/1
40 TABLET ORAL DAILY
Qty: 30 TABLET | Refills: 5 | Status: SHIPPED | OUTPATIENT
Start: 2019-11-01 | End: 2020-05-01 | Stop reason: SDUPTHER

## 2020-01-25 ENCOUNTER — APPOINTMENT (OUTPATIENT)
Dept: CT IMAGING | Age: 60
End: 2020-01-25

## 2020-01-25 ENCOUNTER — HOSPITAL ENCOUNTER (EMERGENCY)
Age: 60
Discharge: HOME | End: 2020-01-26

## 2020-01-25 PROCEDURE — 6370000000 HC RX 637 (ALT 250 FOR IP): Performed by: NURSE PRACTITIONER

## 2020-01-25 PROCEDURE — 99284 EMERGENCY DEPT VISIT MOD MDM: CPT

## 2020-01-25 PROCEDURE — 70450 CT HEAD/BRAIN W/O DYE: CPT

## 2020-01-25 RX ORDER — MULTIVIT-MIN/IRON/FOLIC ACID/K 18-600-40
CAPSULE ORAL
COMMUNITY

## 2020-01-25 RX ORDER — OXYCODONE HYDROCHLORIDE AND ACETAMINOPHEN 5; 325 MG/1; MG/1
1 TABLET ORAL ONCE
Status: COMPLETED | OUTPATIENT
Start: 2020-01-25 | End: 2020-01-25

## 2020-01-25 RX ORDER — ONDANSETRON 4 MG/1
4 TABLET, ORALLY DISINTEGRATING ORAL ONCE
Status: COMPLETED | OUTPATIENT
Start: 2020-01-25 | End: 2020-01-25

## 2020-01-25 RX ORDER — MAGNESIUM 30 MG
30 TABLET ORAL 2 TIMES DAILY
COMMUNITY
End: 2020-05-01 | Stop reason: SDUPTHER

## 2020-01-25 RX ADMIN — ONDANSETRON 4 MG: 4 TABLET, ORALLY DISINTEGRATING ORAL at 23:37

## 2020-01-25 RX ADMIN — OXYCODONE HYDROCHLORIDE AND ACETAMINOPHEN 1 TABLET: 5; 325 TABLET ORAL at 23:37

## 2020-01-25 ASSESSMENT — PAIN SCALES - GENERAL
PAINLEVEL_OUTOF10: 10
PAINLEVEL_OUTOF10: 10

## 2020-01-25 ASSESSMENT — PAIN DESCRIPTION - PAIN TYPE: TYPE: ACUTE PAIN

## 2020-01-25 ASSESSMENT — ENCOUNTER SYMPTOMS
WHEEZING: 0
SORE THROAT: 0
EYE DISCHARGE: 0
TROUBLE SWALLOWING: 0
CONSTIPATION: 0
COUGH: 0
ABDOMINAL PAIN: 0
DIARRHEA: 0
COLOR CHANGE: 0
SHORTNESS OF BREATH: 0
EYE PAIN: 0
RHINORRHEA: 0
EYE REDNESS: 0
BACK PAIN: 0
BLOOD IN STOOL: 0
VOMITING: 0
NAUSEA: 0

## 2020-01-25 ASSESSMENT — PAIN DESCRIPTION - DESCRIPTORS: DESCRIPTORS: ACHING

## 2020-01-25 ASSESSMENT — PAIN DESCRIPTION - LOCATION: LOCATION: HEAD;JAW

## 2020-01-25 ASSESSMENT — PAIN DESCRIPTION - FREQUENCY: FREQUENCY: CONTINUOUS

## 2020-01-26 VITALS
TEMPERATURE: 98.9 F | WEIGHT: 170 LBS | OXYGEN SATURATION: 100 % | DIASTOLIC BLOOD PRESSURE: 71 MMHG | BODY MASS INDEX: 29.02 KG/M2 | RESPIRATION RATE: 18 BRPM | HEIGHT: 64 IN | HEART RATE: 77 BPM | SYSTOLIC BLOOD PRESSURE: 114 MMHG

## 2020-01-26 RX ORDER — NAPROXEN 500 MG/1
500 TABLET ORAL 2 TIMES DAILY
Qty: 20 TABLET | Refills: 0 | Status: SHIPPED | OUTPATIENT
Start: 2020-01-26 | End: 2020-02-05

## 2020-01-26 RX ORDER — ONDANSETRON 4 MG/1
4-8 TABLET, ORALLY DISINTEGRATING ORAL EVERY 12 HOURS PRN
Qty: 12 TABLET | Refills: 0 | Status: SHIPPED | OUTPATIENT
Start: 2020-01-26 | End: 2020-05-01 | Stop reason: ALTCHOICE

## 2020-01-26 RX ORDER — ONDANSETRON 4 MG/1
TABLET, ORALLY DISINTEGRATING ORAL
Status: DISCONTINUED
Start: 2020-01-26 | End: 2020-01-26 | Stop reason: HOSPADM

## 2020-01-26 RX ORDER — ONDANSETRON 2 MG/ML
4 INJECTION INTRAMUSCULAR; INTRAVENOUS ONCE
Status: DISCONTINUED | OUTPATIENT
Start: 2020-01-26 | End: 2020-01-26 | Stop reason: HOSPADM

## 2020-01-26 ASSESSMENT — PAIN SCALES - GENERAL
PAINLEVEL_OUTOF10: 7
PAINLEVEL_OUTOF10: 7

## 2020-01-26 ASSESSMENT — PAIN DESCRIPTION - PAIN TYPE: TYPE: ACUTE PAIN

## 2020-01-26 NOTE — ED PROVIDER NOTES
change, rash and wound. Neurological: Positive for dizziness, light-headedness and headaches. Negative for syncope and weakness. Psychiatric/Behavioral: Negative for behavioral problems. All other systems reviewed and are negative. Except as noted above the remainder of the review of systems was reviewed and negative.        PAST MEDICAL HISTORY     Past Medical History:   Diagnosis Date    Hypertension      Past Surgical History:   Procedure Laterality Date    APPENDECTOMY      26     SECTION      1992    CYST REMOVAL Right 1983    ovarian cyst removal     TONSILLECTOMY  1967     Social History     Socioeconomic History    Marital status: Single     Spouse name: None    Number of children: None    Years of education: None    Highest education level: None   Occupational History    None   Social Needs    Financial resource strain: None    Food insecurity:     Worry: None     Inability: None    Transportation needs:     Medical: None     Non-medical: None   Tobacco Use    Smoking status: Never Smoker    Smokeless tobacco: Never Used   Substance and Sexual Activity    Alcohol use: No    Drug use: No    Sexual activity: Never   Lifestyle    Physical activity:     Days per week: None     Minutes per session: None    Stress: None   Relationships    Social connections:     Talks on phone: None     Gets together: None     Attends Caodaism service: None     Active member of club or organization: None     Attends meetings of clubs or organizations: None     Relationship status: None    Intimate partner violence:     Fear of current or ex partner: None     Emotionally abused: None     Physically abused: None     Forced sexual activity: None   Other Topics Concern    None   Social History Narrative    None       SCREENINGS    Mili Coma Scale  Eye Opening: Spontaneous  Best Verbal Response: Oriented  Best Motor Response: Obeys commands  Mili Coma Scale Score: 15        PHYSICAL interpreted by the emergency physician with the below findings:        Interpretation per the Radiologist below, if available at the time ofthis note:    CT Head WO Contrast    (Results Pending)         ED BEDSIDE ULTRASOUND:   Performed by ED Physician - none    LABS:  Labs Reviewed - No data to display    All other labs were within normal range or not returned as of this dictation. EMERGENCY DEPARTMENT COURSE and DIFFERENTIAL DIAGNOSIS/MDM:   Vitals:    Vitals:    01/25/20 2243 01/26/20 0027 01/26/20 0304   BP: (!) 167/79 125/66 114/71   Pulse: 86 85 77   Resp: 16 16 18   Temp: 98.9 °F (37.2 °C)     TempSrc: Oral     SpO2: 100% 97% 100%   Weight: 170 lb (77.1 kg)     Height: 5' 4\" (1.626 m)              MDM   Patient is a 70-year-old female presenting to the ER for chief complaint of a fall hitting the back of her head. She is hemodynamically stable, nontoxic-appearing, does not appear to be in any acute distress. Able to answer all questions and obeying commands. Patient CT is unremarkable, containing no intracranial abnormality. Patient reports that she is no longer in pain at this time. She is given a prescription for Zofran and for Naprosyn. She is instructed to come back to the emergency department for symptoms worsen anyway or new symptoms develop. She is discharged in stable condition stable vital signs. CRITICAL CARE TIME       CONSULTS:  None    PROCEDURES:  Unless otherwise noted below, none     Procedures    FINAL IMPRESSION      1. Closed head injury, initial encounter    2. Fall, initial encounter          DISPOSITION/PLAN   DISPOSITION Decision To Discharge 01/26/2020 03:40:53 AM      PATIENT REFERRED TO:  Lurdes Alvarado MD  [address] [place] [id number]  [phone]    Schedule an appointment as soon as possible for a visit in 1 day        DISCHARGE MEDICATIONS:  New Prescriptions    NAPROXEN (NAPROSYN) 500 MG TABLET    Take 1 tablet by mouth 2 times daily for 20 doses ONDANSETRON (ZOFRAN ODT) 4 MG DISINTEGRATING TABLET    Take 1-2 tablets by mouth every 12 hours as needed for Nausea May Sub regular tablet (non-ODT) if insurance does not cover ODT.           (Please notethat portions of this note were completed with a voice recognition program.  Efforts were made to edit the dictations but occasionally words are mis-transcribed.)    BRENDEN Coffman CNP (electronically signed)  Attending Emergency Physician          BRENDEN Zimmerman CNP  01/26/20 [id number]

## 2020-05-01 ENCOUNTER — OFFICE VISIT (OUTPATIENT)
Dept: FAMILY MEDICINE CLINIC | Age: 60
End: 2020-05-01

## 2020-05-01 VITALS
HEART RATE: 200 BPM | TEMPERATURE: 97.8 F | WEIGHT: 186 LBS | OXYGEN SATURATION: 97 % | RESPIRATION RATE: 16 BRPM | HEIGHT: 65 IN | SYSTOLIC BLOOD PRESSURE: 124 MMHG | BODY MASS INDEX: 30.99 KG/M2 | DIASTOLIC BLOOD PRESSURE: 80 MMHG

## 2020-05-01 LAB — HBA1C MFR BLD: 5 %

## 2020-05-01 PROCEDURE — 83036 HEMOGLOBIN GLYCOSYLATED A1C: CPT | Performed by: FAMILY MEDICINE

## 2020-05-01 PROCEDURE — 99213 OFFICE O/P EST LOW 20 MIN: CPT | Performed by: FAMILY MEDICINE

## 2020-05-01 RX ORDER — HYDROCHLOROTHIAZIDE 25 MG/1
25 TABLET ORAL EVERY MORNING
Qty: 30 TABLET | Refills: 5 | Status: SHIPPED | OUTPATIENT
Start: 2020-05-01 | End: 2020-11-23

## 2020-05-01 RX ORDER — LISINOPRIL 40 MG/1
40 TABLET ORAL DAILY
Qty: 30 TABLET | Refills: 5 | Status: SHIPPED | OUTPATIENT
Start: 2020-05-01 | End: 2020-11-24 | Stop reason: SDUPTHER

## 2020-05-01 RX ORDER — MAGNESIUM 30 MG
30 TABLET ORAL DAILY
COMMUNITY
Start: 2020-05-01

## 2020-05-01 RX ORDER — AMLODIPINE BESYLATE 10 MG/1
10 TABLET ORAL DAILY
Qty: 30 TABLET | Refills: 5 | Status: SHIPPED | OUTPATIENT
Start: 2020-05-01 | End: 2020-11-24 | Stop reason: SDUPTHER

## 2020-05-01 ASSESSMENT — PATIENT HEALTH QUESTIONNAIRE - PHQ9
SUM OF ALL RESPONSES TO PHQ QUESTIONS 1-9: 0
2. FEELING DOWN, DEPRESSED OR HOPELESS: 0
1. LITTLE INTEREST OR PLEASURE IN DOING THINGS: 0
SUM OF ALL RESPONSES TO PHQ9 QUESTIONS 1 & 2: 0
SUM OF ALL RESPONSES TO PHQ QUESTIONS 1-9: 0

## 2020-05-01 NOTE — PROGRESS NOTES
Chief Complaint   Patient presents with    Hypertension    Skin Problem     very dry trying magnesium, and staying hydrated     Medication Refill        HPI: Gilberto Del Rosario 61 y.o. female presenting for     HTN  Was at an employee clinic. At the clinic, Blood pressure was 220/110. They decided to take it manually and it was 180/100. Admits in the past, her blood pressure was borderline high (managed in the past by diet). Was on hctz in the past but medication was discontinued 2/2 to hypotension. Denies any changes in vision, headaches, chest pain, shortness of breath, abdominal pain, changes in urination, changes in stools. Denies any high sodium intake. Admits to exercising (goes to [employer], treadmill, elliptical 3 times a week at least 1.5-2 hours). Admits to a lot of stress (dealing with caring for her parents before they passed, and daughters pregnancies and supporting her children, and financial issues). Patient does have a family history of HTN.      f/u  Patient reports that she has increased the Norvasc to 10 mg daily. Has been recording her blood pressures. They have ranged in the 208P-799G systolic/ 24C-77Z diastolic. Denies any chest pain, shortness of breath, changes in vision, headaches, abdominal pain, changes in urination, changes in stools. Patient has been trying to reduce her stress in her life. Patient has been taking a baby aspirin daily.     F/u 1/28/19   Blood pressure have improved. Home blood pressures have been in the 150s-160s/70s-80s. Denies any symptoms or complaints at this time. Patient is currently on Lisinopril 20 mg and Norvasc 10 mg. Reports compliance with the medication. Patient still admits to stress in her life.  Denies any fever, chills, nausea, vomiting chest pain, shorn henna of breath, abdominal pain, changes in urination, or changes in stools.       F/u 2/18/19  F/u patient reports that she hsa been doing well blood pressures at home were tablet; Take 1 tablet by mouth daily  Dispense: 30 tablet; Refill: 5  - hydroCHLOROthiazide (HYDRODIURIL) 25 MG tablet; Take 1 tablet by mouth every morning  Dispense: 30 tablet; Refill: 5    2. Left ventricular hypertrophy by electrocardiogram    - amLODIPine (NORVASC) 10 MG tablet; Take 1 tablet by mouth daily  Dispense: 30 tablet; Refill: 5    Follow up in 6 months. Please note, this report has been partially produced using speech recognition software  and may cause  and /or contain errors related to that system including grammar, punctuation and spelling as well as words and phrases that may seem inappropriate. If there are questions or concerns please feel free to contact me to clarify.

## 2020-05-05 ENCOUNTER — TELEPHONE (OUTPATIENT)
Dept: FAMILY MEDICINE CLINIC | Age: 60
End: 2020-05-05

## 2020-05-15 ENCOUNTER — VIRTUAL VISIT (OUTPATIENT)
Dept: FAMILY MEDICINE CLINIC | Age: 60
End: 2020-05-15

## 2020-05-15 ENCOUNTER — HOSPITAL ENCOUNTER (OUTPATIENT)
Dept: WOMENS IMAGING | Age: 60
Discharge: HOME | End: 2020-05-17
Payer: COMMERCIAL

## 2020-05-15 PROCEDURE — 77063 BREAST TOMOSYNTHESIS BI: CPT

## 2020-05-15 PROCEDURE — 99213 OFFICE O/P EST LOW 20 MIN: CPT | Performed by: FAMILY MEDICINE

## 2020-05-15 RX ORDER — AMOXICILLIN AND CLAVULANATE POTASSIUM 875; 125 MG/1; MG/1
1 TABLET, FILM COATED ORAL 2 TIMES DAILY
Qty: 20 TABLET | Refills: 0 | Status: SHIPPED | OUTPATIENT
Start: 2020-05-15 | End: 2020-05-25

## 2020-05-15 RX ORDER — GUAIFENESIN 600 MG/1
600 TABLET, EXTENDED RELEASE ORAL 2 TIMES DAILY
Qty: 30 TABLET | Refills: 0 | Status: SHIPPED | OUTPATIENT
Start: 2020-05-15 | End: 2020-05-30

## 2020-05-15 ASSESSMENT — ENCOUNTER SYMPTOMS
SORE THROAT: 1
CONSTIPATION: 0
SINUS PAIN: 1
CHOKING: 0
EYE ITCHING: 0
NAUSEA: 0
ABDOMINAL DISTENTION: 0
SHORTNESS OF BREATH: 0
RECTAL PAIN: 0
RHINORRHEA: 0
DIARRHEA: 0
COUGH: 1
EYE PAIN: 0
CHEST TIGHTNESS: 0
STRIDOR: 0
PHOTOPHOBIA: 0
EYE DISCHARGE: 0
SINUS PRESSURE: 1
COLOR CHANGE: 0

## 2020-05-15 NOTE — PROGRESS NOTES
5/15/2020    TELEHEALTH EVALUATION -- Audio/Visual (During YRGQK-54 public health emergency)    HPI:    Harmony Moreno (:  1960) has requested an audio/video evaluation for the following concern(s):      Cough that is associated with seasonal allergies. Admits to postnasal drainage. This has been going on for 2 weeks with  The cough. Cough is productive in nature admits to taking dayquil for the cough with some relief of the symptoms. Admits to some sinus pain. Denies any fevers, chills, nausea, vomiting, chest pain, shortness of breath, abdominal pain, change urination, or change in stools. Review of Systems   Constitutional: Negative for activity change, appetite change, fatigue and fever. HENT: Positive for sinus pressure, sinus pain and sore throat. Negative for congestion, drooling, hearing loss, mouth sores, rhinorrhea, sneezing and tinnitus. Eyes: Negative for photophobia, pain, discharge, itching and visual disturbance. Respiratory: Positive for cough. Negative for choking, chest tightness, shortness of breath and stridor. Cardiovascular: Negative for chest pain, palpitations and leg swelling. Gastrointestinal: Negative for abdominal distention, constipation, diarrhea, nausea and rectal pain. Endocrine: Negative for cold intolerance, heat intolerance, polydipsia and polyuria. Genitourinary: Negative for decreased urine volume, difficulty urinating, flank pain, frequency, hematuria and pelvic pain. Musculoskeletal: Negative for arthralgias, gait problem, joint swelling, myalgias and neck pain. Skin: Negative for color change, pallor, rash and wound. Neurological: Negative for dizziness, tremors, seizures, facial asymmetry, speech difficulty, weakness and numbness. Hematological: Negative for adenopathy. Does not bruise/bleed easily.    Psychiatric/Behavioral: Negative for agitation, behavioral problems, decreased concentration, dysphoric mood, self-injury, Neck: [x] No visualized mass     Pulmonary/Chest: [x] Respiratory effort normal.  [x] No visualized signs of difficulty breathing or respiratory distress        [] Abnormal-      Musculoskeletal:   [x] Normal gait with no signs of ataxia         [x] Normal range of motion of neck        [] Abnormal-       Neurological:        [x] No Facial Asymmetry (Cranial nerve 7 motor function) (limited exam to video visit)          [x] No gaze palsy        [] Abnormal-         Skin:        [x] No significant exanthematous lesions or discoloration noted on facial skin         [] Abnormal-            Psychiatric:       [x] Normal Affect [x] No Hallucinations        [] Abnormal-     Other pertinent observable physical exam findings-     ASSESSMENT/PLAN:  1. Sinusitis, unspecified chronicity, unspecified location    - amoxicillin-clavulanate (AUGMENTIN) 875-125 MG per tablet; Take 1 tablet by mouth 2 times daily for 10 days  Dispense: 20 tablet; Refill: 0  - guaiFENesin (MUCINEX) 600 MG extended release tablet; Take 1 tablet by mouth 2 times daily for 15 days  Dispense: 30 tablet; Refill: 0    2. Bronchitis    - amoxicillin-clavulanate (AUGMENTIN) 875-125 MG per tablet; Take 1 tablet by mouth 2 times daily for 10 days  Dispense: 20 tablet; Refill: 0  - guaiFENesin (MUCINEX) 600 MG extended release tablet; Take 1 tablet by mouth 2 times daily for 15 days  Dispense: 30 tablet; Refill: 0      No follow-ups on file. Bry Noriega is a 61 y.o. female being evaluated by a Virtual Visit (video visit) encounter to address concerns as mentioned above. A caregiver was present when appropriate. Due to this being a TeleHealth encounter (During YKKTR-60 public health emergency), evaluation of the following organ systems was limited: Vitals/Constitutional/EENT/Resp/CV/GI//MS/Neuro/Skin/Heme-Lymph-Imm.   Pursuant to the emergency declaration under the [facility], 1135 waiver authority and the Coronavirus Preparedness and Response Supplemental Appropriations Act, this Virtual Visit was conducted with patient's (and/or legal guardian's) consent, to reduce the patient's risk of exposure to COVID-19 and provide necessary medical care. The patient (and/or legal guardian) has also been advised to contact this office for worsening conditions or problems, and seek emergency medical treatment and/or call 911 if deemed necessary. Patient identification was verified at the start of the visit: Yes    Total time spent on this encounter: 15 minutes    Services were provided through a video synchronous discussion virtually to substitute for in-person clinic visit. Patient and provider were located at their individual homes. --Radha Shelton MD on 5/15/2020 at 2:45 PM    An electronic signature was used to authenticate this note.

## 2020-11-23 RX ORDER — HYDROCHLOROTHIAZIDE 25 MG/1
TABLET ORAL
Qty: 30 TABLET | Refills: 5 | Status: SHIPPED | OUTPATIENT
Start: 2020-11-23

## 2020-11-24 RX ORDER — AMLODIPINE BESYLATE 10 MG/1
10 TABLET ORAL DAILY
Qty: 30 TABLET | Refills: 5 | Status: SHIPPED | OUTPATIENT
Start: 2020-11-24

## 2020-11-24 RX ORDER — LISINOPRIL 40 MG/1
40 TABLET ORAL DAILY
Qty: 30 TABLET | Refills: 5 | Status: SHIPPED | OUTPATIENT
Start: 2020-11-24

## 2021-02-25 ENCOUNTER — HOSPITAL ENCOUNTER (EMERGENCY)
Age: 61
Discharge: HOME | End: 2021-02-25
Attending: EMERGENCY MEDICINE
Payer: OTHER GOVERNMENT

## 2021-02-25 VITALS
HEART RATE: 74 BPM | TEMPERATURE: 98.3 F | DIASTOLIC BLOOD PRESSURE: 74 MMHG | HEIGHT: 60 IN | OXYGEN SATURATION: 99 % | RESPIRATION RATE: 18 BRPM | WEIGHT: 195 LBS | BODY MASS INDEX: 38.28 KG/M2 | SYSTOLIC BLOOD PRESSURE: 135 MMHG

## 2021-02-25 DIAGNOSIS — S61.210A LACERATION OF RIGHT INDEX FINGER WITHOUT FOREIGN BODY WITHOUT DAMAGE TO NAIL, INITIAL ENCOUNTER: Primary | ICD-10-CM

## 2021-02-25 PROCEDURE — 96372 THER/PROPH/DIAG INJ SC/IM: CPT

## 2021-02-25 PROCEDURE — 6370000000 HC RX 637 (ALT 250 FOR IP): Performed by: EMERGENCY MEDICINE

## 2021-02-25 PROCEDURE — 12002 RPR S/N/AX/GEN/TRNK2.6-7.5CM: CPT

## 2021-02-25 PROCEDURE — 99285 EMERGENCY DEPT VISIT HI MDM: CPT

## 2021-02-25 PROCEDURE — 6360000002 HC RX W HCPCS: Performed by: EMERGENCY MEDICINE

## 2021-02-25 PROCEDURE — 99284 EMERGENCY DEPT VISIT MOD MDM: CPT

## 2021-02-25 PROCEDURE — 6370000000 HC RX 637 (ALT 250 FOR IP): Performed by: NURSE PRACTITIONER

## 2021-02-25 PROCEDURE — 2500000003 HC RX 250 WO HCPCS: Performed by: EMERGENCY MEDICINE

## 2021-02-25 RX ORDER — BACITRACIN, NEOMYCIN, POLYMYXIN B 400; 3.5; 5 [USP'U]/G; MG/G; [USP'U]/G
OINTMENT TOPICAL ONCE
Status: COMPLETED | OUTPATIENT
Start: 2021-02-25 | End: 2021-02-25

## 2021-02-25 RX ORDER — DIAPER,BRIEF,INFANT-TODD,DISP
EACH MISCELLANEOUS ONCE
Status: DISCONTINUED | OUTPATIENT
Start: 2021-02-25 | End: 2021-02-25 | Stop reason: HOSPADM

## 2021-02-25 RX ORDER — LIDOCAINE HYDROCHLORIDE 10 MG/ML
5 INJECTION, SOLUTION EPIDURAL; INFILTRATION; INTRACAUDAL; PERINEURAL ONCE
Status: COMPLETED | OUTPATIENT
Start: 2021-02-25 | End: 2021-02-25

## 2021-02-25 RX ORDER — OXYCODONE HYDROCHLORIDE AND ACETAMINOPHEN 5; 325 MG/1; MG/1
1 TABLET ORAL ONCE
Status: COMPLETED | OUTPATIENT
Start: 2021-02-25 | End: 2021-02-25

## 2021-02-25 RX ORDER — TRAMADOL HYDROCHLORIDE 50 MG/1
50 TABLET ORAL EVERY 4 HOURS PRN
Qty: 18 TABLET | Refills: 0 | Status: SHIPPED | OUTPATIENT
Start: 2021-02-25 | End: 2021-02-28

## 2021-02-25 RX ORDER — KETOROLAC TROMETHAMINE 30 MG/ML
30 INJECTION, SOLUTION INTRAMUSCULAR; INTRAVENOUS ONCE
Status: COMPLETED | OUTPATIENT
Start: 2021-02-25 | End: 2021-02-25

## 2021-02-25 RX ADMIN — OXYCODONE HYDROCHLORIDE AND ACETAMINOPHEN 1 TABLET: 5; 325 TABLET ORAL at 08:45

## 2021-02-25 RX ADMIN — KETOROLAC TROMETHAMINE 30 MG: 30 INJECTION, SOLUTION INTRAMUSCULAR; INTRAVENOUS at 08:45

## 2021-02-25 RX ADMIN — LIDOCAINE HYDROCHLORIDE 5 ML: 10 INJECTION, SOLUTION EPIDURAL; INFILTRATION; INTRACAUDAL; PERINEURAL at 08:45

## 2021-02-25 RX ADMIN — BACITRACIN, NEOMYCIN, POLYMYXIN B 1 G: 400; 3.5; 5 OINTMENT TOPICAL at 09:25

## 2021-02-25 ASSESSMENT — PAIN DESCRIPTION - PROGRESSION: CLINICAL_PROGRESSION: GRADUALLY WORSENING

## 2021-02-25 ASSESSMENT — PAIN DESCRIPTION - ONSET: ONSET: ON-GOING

## 2021-02-25 ASSESSMENT — ENCOUNTER SYMPTOMS
BACK PAIN: 0
SHORTNESS OF BREATH: 0
ABDOMINAL PAIN: 0
VOMITING: 0
NAUSEA: 0
SORE THROAT: 0
DIARRHEA: 0
COUGH: 0

## 2021-02-25 ASSESSMENT — PAIN DESCRIPTION - ORIENTATION: ORIENTATION: RIGHT

## 2021-02-25 ASSESSMENT — PAIN DESCRIPTION - LOCATION: LOCATION: FINGER (COMMENT WHICH ONE)

## 2021-02-25 ASSESSMENT — PAIN SCALES - GENERAL: PAINLEVEL_OUTOF10: 7

## 2021-02-25 ASSESSMENT — PAIN DESCRIPTION - DESCRIPTORS: DESCRIPTORS: BURNING

## 2021-02-25 ASSESSMENT — PAIN DESCRIPTION - FREQUENCY: FREQUENCY: CONTINUOUS

## 2021-02-25 NOTE — ED NOTES
Dressing applied to right pointer finger. With splint. tolerated well.       Compa Sanches RN  02/25/21 [id number]

## 2021-02-25 NOTE — ED PROVIDER NOTES
[address] [facility] ED  eMERGENCYdEPARTMENT eNCOUnter      Pt Name: Simran Emerson  MRN: [id number]  [place] 1960  Date of evaluation: 2021  Julio C Giron MD    CHIEF COMPLAINT           HPI  Simran Emerson is a 61 y.o. female per chart review has a h/o HTN presents to the ED with finger laceration. Pt notes she was loading the  and then cut her R index finger on a knife. Pt notes moderate, constant, throbbing, R 2nd digit pain. Pt denies any other injury. Pt denies fever, n/v, cp, sob, dysuria, diarrhea. ROS  Review of Systems   Constitutional: Negative for activity change, chills and fever. HENT: Negative for ear pain and sore throat. Eyes: Negative for visual disturbance. Respiratory: Negative for cough and shortness of breath. Cardiovascular: Negative for chest pain, palpitations and leg swelling. Gastrointestinal: Negative for abdominal pain, diarrhea, nausea and vomiting. Genitourinary: Negative for dysuria. Musculoskeletal: Negative for back pain. R 2nd finger pain   Skin: Negative for rash. Neurological: Negative for dizziness and weakness. Except as noted above the remainder of the review of systems was reviewed and negative.        PAST MEDICAL HISTORY     Past Medical History:   Diagnosis Date    Hypertension          SURGICAL HISTORY       Past Surgical History:   Procedure Laterality Date    APPENDECTOMY      26     SECTION          CYST REMOVAL Right     ovarian cyst removal    [address]       Discharge Medication List as of 2021  9:11 AM      CONTINUE these medications which have NOT CHANGED    Details   lisinopril (PRINIVIL;ZESTRIL) 40 MG tablet Take 1 tablet by mouth daily, Disp-30 tablet, R-5Normal      amLODIPine (NORVASC) 10 MG tablet Take 1 tablet by mouth daily, Disp-30 tablet, R-5Normal      hydroCHLOROthiazide (HYDRODIURIL) 25 MG tablet TAKE ONE TABLET BY MOUTH EVERY MORNING, Disp-30 tablet,R-5Normal      magnesium 30 MG tablet Take 1 tablet by mouth dailyHistorical Med      naproxen (NAPROSYN) 500 MG tablet Take 1 tablet by mouth 2 times daily for 20 doses, Disp-20 tablet, R-0Print      Cholecalciferol (VITAMIN D) 50 MCG (2000 UT) CAPS capsule Take by mouthHistorical Med      aspirin 81 MG tablet Take 81 mg by mouth dailyHistorical Med      Multiple Vitamin (MULTI-VITAMINS PO) Take by mouthHistorical Med             ALLERGIES     Sulfa antibiotics    FAMILY HISTORY       Family History   Problem Relation Age of Onset    Diabetes Mother     High Blood Pressure Mother     Stroke Mother     Other Mother         cornary bi pass    Diabetes Father [de-identified]    Dementia Father     Pacemaker Father           SOCIAL HISTORY       Social History     Socioeconomic History    Marital status:      Spouse name: None    Number of children: None    Years of education: None    Highest education level: None   Occupational History    None   Social Needs    Financial resource strain: None    Food insecurity     Worry: None     Inability: None    Transportation needs     Medical: None     Non-medical: None   Tobacco Use    Smoking status: Never Smoker    Smokeless tobacco: Never Used   Substance and Sexual Activity    Alcohol use: No    Drug use: No    Sexual activity: Never   Lifestyle    Physical activity     Days per week: None     Minutes per session: None    Stress: None   Relationships    Social connections     Talks on phone: None     Gets together: None     Attends Holiness service: None     Active member of club or organization: None     Attends meetings of clubs or organizations: None     Relationship status: None    Intimate partner violence     Fear of current or ex partner: None     Emotionally abused: None     Physically abused: None     Forced sexual activity: None   Other Topics Concern    None   Social History Narrative    None PHYSICAL EXAM       ED Triage Vitals   BP Temp Temp src Pulse Resp SpO2 Height Weight   -- -- -- -- -- -- -- --       Physical Exam  Vitals signs and nursing note reviewed. Constitutional:       Appearance: She is well-developed. HENT:      Head: Normocephalic. Right Ear: External ear normal.      Left Ear: External ear normal.   Eyes:      Conjunctiva/sclera: Conjunctivae normal.      Pupils: Pupils are equal, round, and reactive to light. Neck:      Musculoskeletal: Normal range of motion and neck supple. Cardiovascular:      Rate and Rhythm: Normal rate and regular rhythm. Heart sounds: Normal heart sounds. Pulmonary:      Effort: Pulmonary effort is normal.      Breath sounds: Normal breath sounds. Abdominal:      General: Bowel sounds are normal. There is no distension. Palpations: Abdomen is soft. Tenderness: There is no abdominal tenderness. Musculoskeletal: Normal range of motion. Comments: 3 cm laceration noted on tip of R index finger near MCP. No active bleeding. Normal cap refill of R 2nd digit. Skin:     General: Skin is warm and dry. Neurological:      Mental Status: She is alert and oriented to person, place, and time. Psychiatric:         Mood and Affect: Mood normal.           MDM  62 yo female presents to the ED with R index finger laceration. Pt is afebrile, hemodynamically stable. Pt given PO percocet, IM toradol in the ED. Pt is upd on tetanus. Pt educated about finger lacerations. Pt given prescription for tramadol, given finger splint. Pt given finger laceration warning signs and will f/u with pcp. Pt understands plan. Laceration Repair Procedure Note    Indication: Laceration    Procedure: The patient was placed in the appropriate position and anesthesia around the laceration was obtained by infiltration using 1% Lidocaine without epinephrine. The area was then cleansed with betadine and draped in a sterile fashion.  The laceration was closed with 4-0 Ethilon using interrupted sutures. There were no additional lacerations requiring repair. The wound area was then dressed with a sterile dressing. Total repaired wound length: 3 cm. Other Items: Suture count: 3    The patient tolerated the procedure well. Complications: None        FINAL IMPRESSION      1.  Laceration of right index finger without foreign body without damage to nail, initial encounter          DISPOSITION/PLAN   DISPOSITION Decision To Discharge 02/25/2021 09:04:13 AM        DISCHARGE MEDICATIONS:  [unfilled]         Lena Reynolds MD(electronically signed)  Attending Emergency Physician            Lena Reynolds MD  02/26/21 [id number]

## 2021-02-25 NOTE — ED TRIAGE NOTES
Patient arrived from home via family with complaint of putting stuff in  and a knife was sticking up cutter right pointer finger knuckle. Patient A&OX4. Skin pink, warm, and dry. No distress noted. msp intact. Bleeding noted. Hand soaked in betadine upon arrival. No distress noted.

## 2024-07-05 NOTE — ED NOTES
Pt states pain is now 5/10. Pt c/o nausea. Akhil Lucas NP notified.       Amy Olivares RN  01/26/20 [id number] Charles Barron MD

## 2025-05-14 ENCOUNTER — TELEPHONE (OUTPATIENT)
Age: 65
End: 2025-05-14

## 2025-05-14 NOTE — TELEPHONE ENCOUNTER
----- Message from Anamaria SMITH sent at 5/14/2025 12:28 PM EDT -----  Regarding: C Appointment Request  [facility] Appointment Request    Patient needs appointment for [facility] Appointment Type: New Patient.    Patient Requested Dates(s): As soon as possible for med refil/ she is a former patient  Patient Requested Time:  Provider Name: Aparna Goncalves MD    Reason for Appointment Request: New Patient - Available appointments did not meet patient need  --------------------------------------------------------------------------------------------------------------------------    Relationship to Patient: Self     Call Back Information: OK to leave message on voicemail  Preferred Call Back Number: Phone [phone] (home)
